# Patient Record
Sex: MALE | Race: BLACK OR AFRICAN AMERICAN | NOT HISPANIC OR LATINO | URBAN - NONMETROPOLITAN AREA
[De-identification: names, ages, dates, MRNs, and addresses within clinical notes are randomized per-mention and may not be internally consistent; named-entity substitution may affect disease eponyms.]

---

## 2024-08-27 ENCOUNTER — OFFICE VISIT (OUTPATIENT)
Dept: URBAN - NONMETROPOLITAN AREA CLINIC 2 | Facility: CLINIC | Age: 19
End: 2024-08-27

## 2024-08-27 ENCOUNTER — LAB OUTSIDE AN ENCOUNTER (OUTPATIENT)
Dept: URBAN - NONMETROPOLITAN AREA CLINIC 2 | Facility: CLINIC | Age: 19
End: 2024-08-27

## 2024-08-27 VITALS
BODY MASS INDEX: 21.73 KG/M2 | WEIGHT: 135.2 LBS | SYSTOLIC BLOOD PRESSURE: 103 MMHG | HEIGHT: 66 IN | DIASTOLIC BLOOD PRESSURE: 68 MMHG | HEART RATE: 63 BPM

## 2024-08-27 RX ORDER — OMEPRAZOLE 20 MG/1
1 CAPSULE 30 MINUTES BEFORE MORNING MEAL CAPSULE, DELAYED RELEASE ORAL ONCE A DAY
Qty: 90 CAPSULE | Refills: 3 | Status: ACTIVE | COMMUNITY
Start: 2024-06-24

## 2024-08-27 NOTE — HPI-TODAY'S VISIT:
6/24/2024 Ace presents to clinic for evaluation of sore throat with nausea and epigastric pain referred by Dr. Douglas Quinonez. A copy of this note and recommendations will be forwarded to his office.   He was evaluated in the emergency department at [facility] twice earlier this month due to episodes of chest pain feeling like his throat was closing up.  He also notes acid reflux and nausea without emesis.  He was discharged on famotidine daily and states he has had improved symptoms and does better if he avoids spicy foods. He has not had any symptoms like this in the past and this is new for him.  He denies any medical changes or changes in his lifestyle.  He avoids alcohol, drugs and smoking or use of tobacco products, vaping.  He has nor surgical history.  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  He denies any family history of stomach cancer or esophageal cancer, colon cancer.  He does have medical history of asthma and is due to follow-up with pulmonary this week. He denies pain with swallowing, blood in stool or abdominal pain.  He denies any weight loss.  Generally the upper GI symptoms are improving and he is hopeful he can avoid chronic symptoms.  We reviewed GERD diet recommendations and he will start omeprazole 20 mg daily.  He will return in 2 months for evaluation and consider EGD if not improved. SP  8/27/2024  No reflux or heartburn, no nausea No meds No abominal pain Today blood in stool when wiping  This was first time in 1 month  Some straining no noctural  some rectal pain after BM No weight loss no trouble swallowing Proctitis on CT in ED 6/27

## 2024-10-15 ENCOUNTER — OFFICE VISIT (OUTPATIENT)
Dept: URBAN - NONMETROPOLITAN AREA SURGERY CENTER 1 | Facility: SURGERY CENTER | Age: 19
End: 2024-10-15

## 2024-11-19 ENCOUNTER — OFFICE VISIT (OUTPATIENT)
Dept: URBAN - NONMETROPOLITAN AREA CLINIC 2 | Facility: CLINIC | Age: 19
End: 2024-11-19

## 2024-11-21 ENCOUNTER — OFFICE VISIT (OUTPATIENT)
Dept: URBAN - NONMETROPOLITAN AREA SURGERY CENTER 1 | Facility: SURGERY CENTER | Age: 19
End: 2024-11-21

## 2025-01-16 ENCOUNTER — OFFICE VISIT (OUTPATIENT)
Dept: URBAN - NONMETROPOLITAN AREA SURGERY CENTER 1 | Facility: SURGERY CENTER | Age: 20
End: 2025-01-16

## 2025-02-13 ENCOUNTER — TELEPHONE ENCOUNTER (OUTPATIENT)
Dept: URBAN - NONMETROPOLITAN AREA CLINIC 2 | Facility: CLINIC | Age: 20
End: 2025-02-13

## 2025-02-13 PROBLEM — 235595009: Status: ACTIVE | Noted: 2025-02-13

## 2025-02-18 ENCOUNTER — OFFICE VISIT (OUTPATIENT)
Dept: URBAN - NONMETROPOLITAN AREA SURGERY CENTER 1 | Facility: SURGERY CENTER | Age: 20
End: 2025-02-18

## 2025-03-13 ENCOUNTER — LAB OUTSIDE AN ENCOUNTER (OUTPATIENT)
Dept: URBAN - NONMETROPOLITAN AREA CLINIC 2 | Facility: CLINIC | Age: 20
End: 2025-03-13

## 2025-03-13 ENCOUNTER — OFFICE VISIT (OUTPATIENT)
Dept: URBAN - NONMETROPOLITAN AREA CLINIC 2 | Facility: CLINIC | Age: 20
End: 2025-03-13
Payer: SELF-PAY

## 2025-03-13 VITALS
WEIGHT: 129.2 LBS | HEART RATE: 76 BPM | DIASTOLIC BLOOD PRESSURE: 75 MMHG | SYSTOLIC BLOOD PRESSURE: 119 MMHG | HEIGHT: 66 IN | BODY MASS INDEX: 20.76 KG/M2

## 2025-03-13 DIAGNOSIS — R11.0 NAUSEA: ICD-10-CM

## 2025-03-13 DIAGNOSIS — K92.1 HEMATOCHEZIA: ICD-10-CM

## 2025-03-13 DIAGNOSIS — K21.9 CHRONIC GERD: ICD-10-CM

## 2025-03-13 PROCEDURE — 99213 OFFICE O/P EST LOW 20 MIN: CPT

## 2025-03-13 RX ORDER — OMEPRAZOLE 20 MG/1
1 CAPSULE 30 MINUTES BEFORE MORNING MEAL CAPSULE, DELAYED RELEASE ORAL ONCE A DAY
Qty: 90 CAPSULE | Refills: 3 | COMMUNITY
Start: 2024-06-24

## 2025-03-13 RX ORDER — HYDROCORTISONE 25 MG/G
1 APPLICATION CREAM TOPICAL TWICE A DAY
Qty: 1 UNSPECIFIED | Refills: 1 | OUTPATIENT
Start: 2025-03-13 | End: 2025-05-12

## 2025-03-13 NOTE — HPI-TODAY'S VISIT:
3/13/2025 Patient returns to clinic for follow-up.  He was previously scheduled for an EGD and colonoscopy with Dr. Rodrigues.  This was scheduled for history of chronic GERD with stomach pain as well as bright red blood per rectum.  He states he had to cancel due to financial concerns.  Since we last saw him he has been to the emergency department twice in January for 6-month duration of rectal pain with bright red blood per rectum on occasion with bowel movements.  He was given a hydrocortisone and lidocaine cream and recommended to return to GI.  Patient states it has been 2 months since he seen blood per rectum but would like to complete a full evaluation.  He feels his upper GI symptoms have improved and he has no other complaints today.  He would however like to continue with upper endoscopy along with his colonoscopy he states he would like his stomach evaluated. He denies nausea, dysphagia.  He states he is taking no medications at present.  His weight is stable.  He is having a bowel movement every other day.  He does endorse some occasional straining.  He has no constipation or diarrhea.  Rectal exam at the emergency department did not reveal any hemorrhoids. He does report occasional rectal pain. He denies a family history of colon cancer or inflammatory bowel disease. SP

## 2025-04-03 ENCOUNTER — CLAIMS CREATED FROM THE CLAIM WINDOW (OUTPATIENT)
Dept: URBAN - METROPOLITAN AREA CLINIC 4 | Facility: CLINIC | Age: 20
End: 2025-04-03
Payer: SELF-PAY

## 2025-04-03 ENCOUNTER — CLAIMS CREATED FROM THE CLAIM WINDOW (OUTPATIENT)
Dept: URBAN - NONMETROPOLITAN AREA SURGERY CENTER 1 | Facility: SURGERY CENTER | Age: 20
End: 2025-04-03
Payer: SELF-PAY

## 2025-04-03 ENCOUNTER — TELEPHONE ENCOUNTER (OUTPATIENT)
Dept: URBAN - NONMETROPOLITAN AREA CLINIC 2 | Facility: CLINIC | Age: 20
End: 2025-04-03

## 2025-04-03 DIAGNOSIS — K21.9 GASTRO-ESOPHAGEAL REFLUX DISEASE WITHOUT ESOPHAGITIS: ICD-10-CM

## 2025-04-03 DIAGNOSIS — R12 BURNING REFLUX: ICD-10-CM

## 2025-04-03 DIAGNOSIS — K62.5 ANAL BLEEDING: ICD-10-CM

## 2025-04-03 DIAGNOSIS — K31.89 OTHER DISEASES OF STOMACH AND DUODENUM: ICD-10-CM

## 2025-04-03 DIAGNOSIS — K21.9 ACID REFLUX: ICD-10-CM

## 2025-04-03 DIAGNOSIS — K62.5 RECTAL BLEEDING: ICD-10-CM

## 2025-04-03 DIAGNOSIS — K21.9 GASTROESOPHAGEAL REFLUX DISEASE WITHOUT ESOPHAGITIS: ICD-10-CM

## 2025-04-03 PROCEDURE — 88305 TISSUE EXAM BY PATHOLOGIST: CPT | Performed by: PATHOLOGY

## 2025-04-03 PROCEDURE — 88312 SPECIAL STAINS GROUP 1: CPT | Performed by: PATHOLOGY

## 2025-04-03 PROCEDURE — 43239 EGD BIOPSY SINGLE/MULTIPLE: CPT | Performed by: INTERNAL MEDICINE

## 2025-04-03 PROCEDURE — 45378 DIAGNOSTIC COLONOSCOPY: CPT | Performed by: INTERNAL MEDICINE

## 2025-04-03 PROCEDURE — 00813 ANES UPR LWR GI NDSC PX: CPT | Performed by: NURSE ANESTHETIST, CERTIFIED REGISTERED

## 2025-04-03 RX ORDER — HYDROCORTISONE 25 MG/G
1 APPLICATION CREAM TOPICAL TWICE A DAY
Qty: 1 UNSPECIFIED | Refills: 1 | Status: ACTIVE | COMMUNITY
Start: 2025-03-13 | End: 2025-05-12

## 2025-04-03 RX ORDER — OMEPRAZOLE 20 MG/1
1 CAPSULE 30 MINUTES BEFORE MORNING MEAL CAPSULE, DELAYED RELEASE ORAL ONCE A DAY
Qty: 90 CAPSULE | Refills: 3 | COMMUNITY
Start: 2024-06-24

## 2025-04-08 ENCOUNTER — OFFICE VISIT (OUTPATIENT)
Dept: URBAN - NONMETROPOLITAN AREA CLINIC 2 | Facility: CLINIC | Age: 20
End: 2025-04-08

## 2025-05-15 ENCOUNTER — OFFICE VISIT (OUTPATIENT)
Dept: URBAN - NONMETROPOLITAN AREA CLINIC 2 | Facility: CLINIC | Age: 20
End: 2025-05-15
Payer: SELF-PAY

## 2025-05-15 DIAGNOSIS — R11.0 NAUSEA: ICD-10-CM

## 2025-05-15 DIAGNOSIS — R10.13 EPIGASTRIC ABDOMINAL PAIN: ICD-10-CM

## 2025-05-15 DIAGNOSIS — K92.1 HEMATOCHEZIA: ICD-10-CM

## 2025-05-15 PROCEDURE — 99213 OFFICE O/P EST LOW 20 MIN: CPT | Performed by: INTERNAL MEDICINE

## 2025-05-15 RX ORDER — OMEPRAZOLE 20 MG/1
1 CAPSULE 30 MINUTES BEFORE MORNING MEAL CAPSULE, DELAYED RELEASE ORAL ONCE A DAY
Qty: 90 CAPSULE | Refills: 3 | COMMUNITY
Start: 2024-06-24

## 2025-05-15 NOTE — HPI-TODAY'S VISIT:
3/13/2025 Patient returns to clinic for follow-up.  He was previously scheduled for an EGD and colonoscopy with Dr. Rodrigues.  This was scheduled for history of chronic GERD with stomach pain as well as bright red blood per rectum.  He states he had to cancel due to financial concerns.  Since we last saw him he has been to the emergency department twice in January for 6-month duration of rectal pain with bright red blood per rectum on occasion with bowel movements.  He was given a hydrocortisone and lidocaine cream and recommended to return to GI.  Patient states it has been 2 months since he seen blood per rectum but would like to complete a full evaluation.  He feels his upper GI symptoms have improved and he has no other complaints today.  He would however like to continue with upper endoscopy along with his colonoscopy he states he would like his stomach evaluated. He denies nausea, dysphagia.  He states he is taking no medications at present.  His weight is stable.  He is having a bowel movement every other day.  He does endorse some occasional straining.  He has no constipation or diarrhea.  Rectal exam at the emergency department did not reveal any hemorrhoids. He does report occasional rectal pain. He denies a family history of colon cancer or inflammatory bowel disease.  SP  5/15/25: Mr. Cano returns for follow-up of rectal bleeding.  He had EGD and colonoscopy since last visit.  EGD was normal with normal biopsies.  Colonoscopy with hemorrhoids, otherwise normal.  Today he is doing well.  No further bleeding.  Minimal to no rectal pain.  Stools are soft.  He was using hemorrhoid cream but none lately.

## 2025-06-26 ENCOUNTER — OFFICE VISIT (OUTPATIENT)
Dept: URBAN - NONMETROPOLITAN AREA SURGERY CENTER 1 | Facility: SURGERY CENTER | Age: 20
End: 2025-06-26